# Patient Record
Sex: FEMALE | URBAN - METROPOLITAN AREA
[De-identification: names, ages, dates, MRNs, and addresses within clinical notes are randomized per-mention and may not be internally consistent; named-entity substitution may affect disease eponyms.]

---

## 2019-04-18 ENCOUNTER — NURSE TRIAGE (OUTPATIENT)
Dept: ADMINISTRATIVE | Facility: CLINIC | Age: 54
End: 2019-04-18

## 2019-04-18 NOTE — TELEPHONE ENCOUNTER
"Voided at 7 pm with some discomfort and saw some blood in toilet. Voided again in 5 min and saw a little blood againl    Reason for Disposition   Pain or burning with passing urine    Answer Assessment - Initial Assessment Questions  1. COLOR of URINE: "Describe the color of the urine."  (e.g., tea-colored, pink, red, blood clots, bloody)      Saw blood in toilet and when wiping  2. ONSET: "When did the bleeding start?"       7 pm  3. EPISODES: "How many times has there been blood in the urine?" or "How many times today?"      4  4. PAIN with URINATION: "Is there any pain with passing your urine?" If so, ask: "How bad is the pain?"  (Scale 1-10; or mild, moderate, severe)     - MILD - complains slightly about urination hurting     - MODERATE - interferes with normal activities       - SEVERE - excruciating, unwilling or unable to urinate because of the pain       3-4  5. FEVER: "Do you have a fever?" If so, ask: "What is your temperature, how was it measured, and when did it start?"      no  6. ASSOCIATED SYMPTOMS: "Are you passing urine more frequently than usual?"      frequency  7. OTHER SYMPTOMS: "Do you have any other symptoms?" (e.g., back/flank pain, abdominal pain, vomiting)      none  8. PREGNANCY: "Is there any chance you are pregnant?" "When was your last menstrual period?"      no    Protocols used: ST URINE - BLOOD IN-[facility]      "